# Patient Record
Sex: MALE | Race: WHITE | Employment: OTHER | URBAN - METROPOLITAN AREA
[De-identification: names, ages, dates, MRNs, and addresses within clinical notes are randomized per-mention and may not be internally consistent; named-entity substitution may affect disease eponyms.]

---

## 2017-03-15 PROCEDURE — 82607 VITAMIN B-12: CPT | Performed by: INTERNAL MEDICINE

## 2017-04-19 PROCEDURE — 81001 URINALYSIS AUTO W/SCOPE: CPT | Performed by: UROLOGY

## 2017-11-08 PROCEDURE — 84156 ASSAY OF PROTEIN URINE: CPT | Performed by: INTERNAL MEDICINE

## 2017-11-08 PROCEDURE — 81001 URINALYSIS AUTO W/SCOPE: CPT | Performed by: INTERNAL MEDICINE

## 2017-11-08 PROCEDURE — 82570 ASSAY OF URINE CREATININE: CPT | Performed by: INTERNAL MEDICINE

## 2017-11-08 PROCEDURE — 82310 ASSAY OF CALCIUM: CPT | Performed by: INTERNAL MEDICINE

## 2017-11-08 PROCEDURE — 82565 ASSAY OF CREATININE: CPT | Performed by: INTERNAL MEDICINE

## 2017-11-08 PROCEDURE — 83970 ASSAY OF PARATHORMONE: CPT | Performed by: INTERNAL MEDICINE

## 2017-11-08 PROCEDURE — 84100 ASSAY OF PHOSPHORUS: CPT | Performed by: INTERNAL MEDICINE

## 2017-11-22 ENCOUNTER — HOSPITAL ENCOUNTER (INPATIENT)
Facility: HOSPITAL | Age: 82
LOS: 5 days | Discharge: NURSING, REHAB OR LONG-TERM CARE | DRG: 871 | End: 2017-11-27
Attending: EMERGENCY MEDICINE | Admitting: HOSPITALIST
Payer: MEDICARE

## 2017-11-22 ENCOUNTER — APPOINTMENT (OUTPATIENT)
Dept: GENERAL RADIOLOGY | Facility: HOSPITAL | Age: 82
DRG: 871 | End: 2017-11-22
Attending: EMERGENCY MEDICINE
Payer: MEDICARE

## 2017-11-22 ENCOUNTER — APPOINTMENT (OUTPATIENT)
Dept: CT IMAGING | Facility: HOSPITAL | Age: 82
DRG: 871 | End: 2017-11-22
Attending: EMERGENCY MEDICINE
Payer: MEDICARE

## 2017-11-22 ENCOUNTER — APPOINTMENT (OUTPATIENT)
Dept: CV DIAGNOSTICS | Facility: HOSPITAL | Age: 82
DRG: 871 | End: 2017-11-22
Attending: HOSPITALIST
Payer: MEDICARE

## 2017-11-22 DIAGNOSIS — N18.9 CHRONIC RENAL IMPAIRMENT, UNSPECIFIED CKD STAGE: ICD-10-CM

## 2017-11-22 DIAGNOSIS — R77.8 ELEVATED TROPONIN I LEVEL: ICD-10-CM

## 2017-11-22 DIAGNOSIS — J18.9 COMMUNITY ACQUIRED PNEUMONIA OF LEFT LOWER LOBE OF LUNG: Primary | ICD-10-CM

## 2017-11-22 DIAGNOSIS — G93.40 ENCEPHALOPATHY: ICD-10-CM

## 2017-11-22 PROCEDURE — 85025 COMPLETE CBC W/AUTO DIFF WBC: CPT | Performed by: EMERGENCY MEDICINE

## 2017-11-22 PROCEDURE — 81001 URINALYSIS AUTO W/SCOPE: CPT | Performed by: EMERGENCY MEDICINE

## 2017-11-22 PROCEDURE — 93306 TTE W/DOPPLER COMPLETE: CPT | Performed by: HOSPITALIST

## 2017-11-22 PROCEDURE — 93010 ELECTROCARDIOGRAM REPORT: CPT | Performed by: EMERGENCY MEDICINE

## 2017-11-22 PROCEDURE — 87633 RESP VIRUS 12-25 TARGETS: CPT | Performed by: HOSPITALIST

## 2017-11-22 PROCEDURE — 96366 THER/PROPH/DIAG IV INF ADDON: CPT

## 2017-11-22 PROCEDURE — 99285 EMERGENCY DEPT VISIT HI MDM: CPT

## 2017-11-22 PROCEDURE — 80061 LIPID PANEL: CPT | Performed by: EMERGENCY MEDICINE

## 2017-11-22 PROCEDURE — 87040 BLOOD CULTURE FOR BACTERIA: CPT | Performed by: EMERGENCY MEDICINE

## 2017-11-22 PROCEDURE — 83605 ASSAY OF LACTIC ACID: CPT | Performed by: EMERGENCY MEDICINE

## 2017-11-22 PROCEDURE — 93005 ELECTROCARDIOGRAM TRACING: CPT

## 2017-11-22 PROCEDURE — 71010 XR CHEST AP PORTABLE  (CPT=71010): CPT | Performed by: EMERGENCY MEDICINE

## 2017-11-22 PROCEDURE — 80048 BASIC METABOLIC PNL TOTAL CA: CPT | Performed by: EMERGENCY MEDICINE

## 2017-11-22 PROCEDURE — 87486 CHLMYD PNEUM DNA AMP PROBE: CPT | Performed by: HOSPITALIST

## 2017-11-22 PROCEDURE — 87798 DETECT AGENT NOS DNA AMP: CPT | Performed by: HOSPITALIST

## 2017-11-22 PROCEDURE — 84484 ASSAY OF TROPONIN QUANT: CPT | Performed by: EMERGENCY MEDICINE

## 2017-11-22 PROCEDURE — 84484 ASSAY OF TROPONIN QUANT: CPT | Performed by: HOSPITALIST

## 2017-11-22 PROCEDURE — 70450 CT HEAD/BRAIN W/O DYE: CPT | Performed by: EMERGENCY MEDICINE

## 2017-11-22 PROCEDURE — 85027 COMPLETE CBC AUTOMATED: CPT | Performed by: EMERGENCY MEDICINE

## 2017-11-22 PROCEDURE — 83880 ASSAY OF NATRIURETIC PEPTIDE: CPT | Performed by: EMERGENCY MEDICINE

## 2017-11-22 PROCEDURE — 96365 THER/PROPH/DIAG IV INF INIT: CPT

## 2017-11-22 PROCEDURE — 87581 M.PNEUMON DNA AMP PROBE: CPT | Performed by: HOSPITALIST

## 2017-11-22 PROCEDURE — 84145 PROCALCITONIN (PCT): CPT | Performed by: EMERGENCY MEDICINE

## 2017-11-22 PROCEDURE — 36415 COLL VENOUS BLD VENIPUNCTURE: CPT

## 2017-11-22 PROCEDURE — 85007 BL SMEAR W/DIFF WBC COUNT: CPT | Performed by: EMERGENCY MEDICINE

## 2017-11-22 PROCEDURE — 87086 URINE CULTURE/COLONY COUNT: CPT | Performed by: EMERGENCY MEDICINE

## 2017-11-22 RX ORDER — PAROXETINE HYDROCHLORIDE 20 MG/1
20 TABLET, FILM COATED ORAL DAILY
Status: DISCONTINUED | OUTPATIENT
Start: 2017-11-23 | End: 2017-11-27

## 2017-11-22 RX ORDER — ASPIRIN 81 MG/1
324 TABLET, CHEWABLE ORAL ONCE
Status: COMPLETED | OUTPATIENT
Start: 2017-11-22 | End: 2017-11-22

## 2017-11-22 RX ORDER — ACETAMINOPHEN 325 MG/1
650 TABLET ORAL EVERY 6 HOURS PRN
Status: DISCONTINUED | OUTPATIENT
Start: 2017-11-22 | End: 2017-11-27

## 2017-11-22 RX ORDER — SODIUM CHLORIDE 9 MG/ML
INJECTION, SOLUTION INTRAVENOUS CONTINUOUS
Status: DISCONTINUED | OUTPATIENT
Start: 2017-11-22 | End: 2017-11-25

## 2017-11-22 RX ORDER — ATORVASTATIN CALCIUM 10 MG/1
10 TABLET, FILM COATED ORAL DAILY
Status: DISCONTINUED | OUTPATIENT
Start: 2017-11-23 | End: 2017-11-27

## 2017-11-22 RX ORDER — FLUTICASONE PROPIONATE 50 MCG
1 SPRAY, SUSPENSION (ML) NASAL DAILY PRN
COMMUNITY

## 2017-11-22 RX ORDER — ISOSORBIDE MONONITRATE 60 MG/1
60 TABLET, EXTENDED RELEASE ORAL DAILY
Status: DISCONTINUED | OUTPATIENT
Start: 2017-11-22 | End: 2017-11-26

## 2017-11-22 RX ORDER — BUPROPION HYDROCHLORIDE 300 MG/1
300 TABLET ORAL DAILY
Status: DISCONTINUED | OUTPATIENT
Start: 2017-11-23 | End: 2017-11-27

## 2017-11-22 RX ORDER — ONDANSETRON 2 MG/ML
4 INJECTION INTRAMUSCULAR; INTRAVENOUS EVERY 6 HOURS PRN
Status: DISCONTINUED | OUTPATIENT
Start: 2017-11-22 | End: 2017-11-27

## 2017-11-22 RX ORDER — METOPROLOL SUCCINATE 25 MG/1
25 TABLET, EXTENDED RELEASE ORAL
Status: DISCONTINUED | OUTPATIENT
Start: 2017-11-22 | End: 2017-11-27

## 2017-11-22 RX ORDER — ALFUZOSIN HYDROCHLORIDE 10 MG/1
10 TABLET, EXTENDED RELEASE ORAL
Status: DISCONTINUED | OUTPATIENT
Start: 2017-11-23 | End: 2017-11-27

## 2017-11-22 RX ORDER — ALPRAZOLAM 0.5 MG/1
0.5 TABLET ORAL 3 TIMES DAILY PRN
Status: DISCONTINUED | OUTPATIENT
Start: 2017-11-22 | End: 2017-11-27

## 2017-11-22 RX ORDER — HYDRALAZINE HYDROCHLORIDE 25 MG/1
25 TABLET, FILM COATED ORAL EVERY 8 HOURS PRN
Status: DISCONTINUED | OUTPATIENT
Start: 2017-11-22 | End: 2017-11-27

## 2017-11-22 RX ORDER — DONEPEZIL HYDROCHLORIDE 5 MG/1
5 TABLET, FILM COATED ORAL NIGHTLY
Status: DISCONTINUED | OUTPATIENT
Start: 2017-11-22 | End: 2017-11-27

## 2017-11-22 RX ORDER — HEPARIN SODIUM 5000 [USP'U]/ML
5000 INJECTION, SOLUTION INTRAVENOUS; SUBCUTANEOUS EVERY 8 HOURS SCHEDULED
Status: DISCONTINUED | OUTPATIENT
Start: 2017-11-22 | End: 2017-11-27

## 2017-11-22 RX ORDER — ASPIRIN 81 MG/1
81 TABLET ORAL DAILY
Status: DISCONTINUED | OUTPATIENT
Start: 2017-11-23 | End: 2017-11-27

## 2017-11-22 RX ORDER — SODIUM CHLORIDE 0.9 % (FLUSH) 0.9 %
3 SYRINGE (ML) INJECTION AS NEEDED
Status: DISCONTINUED | OUTPATIENT
Start: 2017-11-22 | End: 2017-11-27

## 2017-11-22 RX ORDER — FINASTERIDE 5 MG/1
5 TABLET, FILM COATED ORAL DAILY
Status: DISCONTINUED | OUTPATIENT
Start: 2017-11-23 | End: 2017-11-27

## 2017-11-22 NOTE — ED PROVIDER NOTES
Patient Seen in: [facility] AND [facility] Emergency Department    History   Patient presents with:  Fall (musculoskeletal, neurologic)    Stated Complaint: falls    HPI    81year old male with h/o dementia, BPH and CKD, recurrent UTI, high cholesterol, hypocal CATARACT EXTRACAP,INSERT LENS  2007: TOTAL KNEE REPLACEMENT      Comment: right        Smoking status: Former Smoker                                                              Packs/day: 0.50      Years: 10.00        Quit date: 1/1/1972  Smokeless tobacc coordination. No dysarthria. Pt is confused but answers questions appropriately at times. When asked if he saw a londono today he states \"no it was a nuno reeves terrier\" - states does not see anything now. Skin: Skin is warm and dry. No rash noted.  He PLATELET    Narrative: The following orders were created for panel order CBC WITH DIFFERENTIAL WITH PLATELET.   Procedure                               Abnormality         Status                     ---------                               ----------- Pulses:  Radial: Right 1+ or Left 1+      Capillary Refill:  <3 Secs    Skin:  Temp/Moisture: Warm and Dry  Color: Normal       Comfort Barcenas  11/22/2017  11:04 AM            Admission disposition: 11/22/2017 11:05 AM         Left lower lobe bronchi, retr

## 2017-11-22 NOTE — ED NOTES
Pt ambulated to bathroom with assist and back to bed. Pt is verbal but confused. Wife is at bedside now.  Dr Reji Gutierrez at bedside assessing

## 2017-11-22 NOTE — CONSULTS
Pulmonary Consult     Assessment / Plan:  1. CAP  - ceftriaxone and azithro  - follow-up cultures    Thanks.  Will follow    Reji Stock MD  Pulmonary and Critical Care Medicine      History of Present Illness:   Mr. Seth Markham is a 81year old with hist TAKE 1 CAPSULE BY MOUTH TWICE DAILY Disp: 180 capsule Rfl: 0 Taking   DONEPEZIL HCL 5 MG Oral Tab TAKE 1 TABLET BY MOUTH EVERY NIGHT AT BEDTIME Disp: 90 tablet Rfl: 0 Taking   PARoxetine HCl 20 MG Oral Tab TAKE 1 TABLET BY MOUTH EVERY MORNING Disp: 90 tabl HCL ER, XL, 300 MG Oral Tablet 24 Hr TAKE 1 TABLET BY MOUTH EVERY DAY Disp: 90 tablet Rfl: 3   Cranberry 200 MG Oral Cap Take by mouth 3 (three) times daily.    Disp:  Rfl:         Iodine Tincture             Comment:IV iodine     Social History  Social His

## 2017-11-22 NOTE — CONSULTS
Reason for Consultation: Abnormal troponin    Assessment/Plan:       1. Community acquired pneumonia of left lower lobe of lung ([facility] [address]  2. 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 Dementia  4. CKD  5. Cancer Neg       Past Surgical History:  1/8/14: OTHER SURGICAL HISTORY      Comment: Cysto-Dr Jael Leija  2007: [facility] CATARACT EXTRACAP,INSERT LENS  2008: [facility] CATARACT EXTRACAP,INSERT LENS  2007: TOTAL KNEE REPLACEMENT      Comment: right  Family History of Pr for the past 24 hrs:   BP Temp Temp src Pulse Resp SpO2 Weight   11/22/17 1400 (!) 176/83 98.1 °F (36.7 °C) Oral 80 20 97 % -   11/22/17 1354 (!) 170/86 - - 85 - 99 % -   11/22/17 1254 (!) 174/84 - - 84 20 97 % -   11/22/17 1152 (!) 168/85 - - 82 20 99 % - atherosclerosis cavernous carotid arteries. 4. Right parietal scalp contusion. Xr Chest Ap Portable  (cpt=71010)    Result Date: 11/22/2017  CONCLUSION:  1. Mild left retrocardiac opacification.                 Thank you for allowing me to participa

## 2017-11-22 NOTE — H&P
[facility] Hospitalist H&P       CC: Patient presents with:  Fall (musculoskeletal, neurologic)       PCP: Dewey Penn MD    History of Present Illness: Patient is a 81year old male with PMH sig for HTN, HLD, depression, CKD stage 3, hs of freq UTI's, dem REPLACEMENT      Comment: right     ALL:    Iodine Tincture             Comment:IV iodine     Home Medications:    No outpatient prescriptions have been marked as taking for the 11/22/17 encounter [facility] Encounter).       Soc Hx     Smoking status: Forme ALPHOS, TBIL, DBIL, TPROT    Recent Labs   Lab  11/22/17   0942   TROP  0.13*          Radiology: Ct Brain Or Head ([id number])    Result Date: 11/22/2017  CONCLUSION:  1. No acute intracranial finding.  2. Moderate changes of chronic small vessel disease in cer house    Patient and/or patient's family given opportunity to ask questions and note understanding and agreeing with therapeutic plan as outlined    Thank Marilyn Borges MD    [facility] Hospitalist  Answering Service number: [phone]

## 2017-11-22 NOTE — CM/SW NOTE
Spoke with patients daughter Tejas Vela verbal Milas Pat given from the patient to update her on his status as well as Rachana Faisal patient's son. After medically stable patient daughter is requesting [facility] [phone] for rehab/further care. Referral sent.

## 2017-11-23 ENCOUNTER — APPOINTMENT (OUTPATIENT)
Dept: GENERAL RADIOLOGY | Facility: HOSPITAL | Age: 82
DRG: 871 | End: 2017-11-23
Attending: HOSPITALIST
Payer: MEDICARE

## 2017-11-23 PROCEDURE — 85610 PROTHROMBIN TIME: CPT | Performed by: HOSPITALIST

## 2017-11-23 PROCEDURE — 94668 MNPJ CHEST WALL SBSQ: CPT

## 2017-11-23 PROCEDURE — 94667 MNPJ CHEST WALL 1ST: CPT

## 2017-11-23 PROCEDURE — 71020 XR CHEST PA + LAT CHEST (CPT=71020): CPT | Performed by: HOSPITALIST

## 2017-11-23 PROCEDURE — 80048 BASIC METABOLIC PNL TOTAL CA: CPT | Performed by: HOSPITALIST

## 2017-11-23 PROCEDURE — 84443 ASSAY THYROID STIM HORMONE: CPT | Performed by: HOSPITALIST

## 2017-11-23 PROCEDURE — 85025 COMPLETE CBC W/AUTO DIFF WBC: CPT | Performed by: HOSPITALIST

## 2017-11-23 PROCEDURE — 83735 ASSAY OF MAGNESIUM: CPT | Performed by: HOSPITALIST

## 2017-11-23 RX ORDER — POTASSIUM CHLORIDE 20 MEQ/1
20 TABLET, EXTENDED RELEASE ORAL ONCE
Status: COMPLETED | OUTPATIENT
Start: 2017-11-23 | End: 2017-11-23

## 2017-11-23 NOTE — PLAN OF CARE
Patient/Family Goals    • Patient/Family Long Term Goal Not Progressing    • Patient/Family Short Term Goal Not Progressing        Patient unable to state goals at this time.  Patient is resting in bed, but at times can get very agitated and try to take off

## 2017-11-23 NOTE — PROGRESS NOTES
[facility] Hospitalist Progress Note     PCP: Gloris Meckel, MD    CC: Follow up       Assessment/Plan:   Mr. Mauro Gabriel is a 81year old male with PMH sig for HTN, HLD, depression, CKD stage 3, hs of freq UTI's, dementia, who presents with cough congestion, ge outlined. D/W wife at bedside    Thank Adam Santiago M.D.  [facility] Hospitalist  Answering Service: [phone]        Subjective     States he feels fine, denies feeling SOB or having CP.   Per wife is doing much better,  Ismael Velázquez me a song to hydrALAzine HCl, ALPRAZolam    Data Review:       Labs:     Recent Labs   Lab  11/22/17   0942  11/23/17   0558   WBC  14.7*  9.6   HGB  11.0*  10.4*   MCV  91.9  90.7   PLT  133*  137*   BAND  5   --    INR   --   1.1       Recent Labs   Lab  11/22/17   0

## 2017-11-23 NOTE — PROGRESS NOTES
Assessment and Plan:     1. Community acquired pneumonia of left lower lobe of lung ([address]  2. 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 Dementia  4. CKD  5. HTN  6.  HLD        PLAN:  - ASA, Echo  - retrocardiac opacification.          Ekg 12-lead    Result Date: 11/22/2017  ECG Report  Interpretation  -------------------------- Sinus Rhythm -First degree A-V block Filippo = 258 -Old anteroseptal infarct -Intraventricular conduction delay -may be secondary

## 2017-11-23 NOTE — PROGRESS NOTES
Pulmonary Progress Note     Assessment / Plan:  1. CAP - RVP notable for coronavirus. Likely has bacterial superinfection given markedly elevated procalcitonin  - ceftriaxone and azithro  - follow-up cultures  - check PCT tomorrow  2.  Elevated troponin  -

## 2017-11-23 NOTE — PLAN OF CARE
Patient/Family Goals    • Patient/Family Long Term Goal Not Progressing    • Patient/Family Short Term Goal Not Progressing          Safety Risk - Non-Violent Restraints    • Patient will remain free from self-harm Progressing        Patient very confused,

## 2017-11-24 PROCEDURE — 97530 THERAPEUTIC ACTIVITIES: CPT

## 2017-11-24 PROCEDURE — 85025 COMPLETE CBC W/AUTO DIFF WBC: CPT | Performed by: HOSPITALIST

## 2017-11-24 PROCEDURE — 84132 ASSAY OF SERUM POTASSIUM: CPT | Performed by: HOSPITALIST

## 2017-11-24 PROCEDURE — 80048 BASIC METABOLIC PNL TOTAL CA: CPT | Performed by: HOSPITALIST

## 2017-11-24 PROCEDURE — 84145 PROCALCITONIN (PCT): CPT | Performed by: INTERNAL MEDICINE

## 2017-11-24 PROCEDURE — 97165 OT EVAL LOW COMPLEX 30 MIN: CPT

## 2017-11-24 PROCEDURE — 97161 PT EVAL LOW COMPLEX 20 MIN: CPT

## 2017-11-24 RX ORDER — LISINOPRIL 10 MG/1
10 TABLET ORAL DAILY
Status: DISCONTINUED | OUTPATIENT
Start: 2017-11-24 | End: 2017-11-27

## 2017-11-24 RX ORDER — SODIUM CHLORIDE 9 MG/ML
INJECTION, SOLUTION INTRAVENOUS
Status: COMPLETED
Start: 2017-11-24 | End: 2017-11-24

## 2017-11-24 NOTE — PLAN OF CARE
ANXIETY    • Will report anxiety at manageable levels Progressing        CONFUSION    • Confusion, delirium, dementia or psychosis is improved or at baseline Progressing        Patient/Family Goals    • Patient/Family Long Term Goal Progressing    • Patien

## 2017-11-24 NOTE — PLAN OF CARE
CONFUSION    • Confusion, delirium, dementia or psychosis is improved or at baseline Not Progressing          ANXIETY    • Will report anxiety at manageable levels Progressing        Patient/Family Goals    • Patient/Family Long Term Goal Progressing    •

## 2017-11-24 NOTE — PLAN OF CARE
CONTINUING IVF AND IV ANTIBIOTICS, PT. VERY CONFUSED, CONSISTENTLY TRYING TO GET OUT OF BED AND PULL AT LINES,

## 2017-11-24 NOTE — PROGRESS NOTES
[facility] Hospitalist Progress Note     PCP: Urvashi Davis MD    CC: Follow up       Assessment/Plan:   Mr. Alisson Evans is a 81year old male with PMH sig for HTN, HLD, depression, CKD stage 3, hs of freq UTI's, dementia, who presents with cough congestion, ge and agreeing with therapeutic plan as outlined. Will update wife. Thank Lara Fregoso M.D.    [facility] Hospitalist  Answering Service: [phone]        Subjective     No new complaints, wife not at bedside.       Objective     OBJECT HCl, hydrALAzine HCl, ALPRAZolam    Data Review:       Labs:     Recent Labs   Lab  11/22/17   0942  11/23/17   0558  11/24/17   0619   WBC  14.7*  9.6  9.0   HGB  11.0*  10.4*  10.4*   MCV  91.9  90.7  91.5   PLT  133*  137*  142   BAND  5   --    --    I

## 2017-11-24 NOTE — PROGRESS NOTES
Pulmonary Progress Note     Assessment / Plan:  1. CAP - RVP notable for coronavirus. Likely has bacterial superinfection given markedly elevated procalcitonin. Repeat CXR is improved. PCT is downtrending, albeit slowly  - ceftriaxone and azithro.  Okay to

## 2017-11-24 NOTE — PROGRESS NOTES
Assessment and Plan:     1. Community acquired pneumonia of left lower lobe of lung ([facility] [address]  2. 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 Dementia  4. CKD, stable  5. HTN, elevated BPs  6.  HLD, on sta improved since previous day, suggesting resolving atelectasis or pneumonia. 2. Hyperinflated lungs with prominent markings. 3. Mild cardiomegaly. 4. Small bilateral pleural effusions noted on lateral view.          Ct Brain Or Head ([id number])    Result Date: 1

## 2017-11-24 NOTE — OCCUPATIONAL THERAPY NOTE
OCCUPATIONAL THERAPY EVALUATION - INPATIENT     Room Number: 303/303-A  Evaluation Date: 11/24/2017  Type of Evaluation: Initial  Presenting Problem: pneumonia    Physician Order: IP Consult to Occupational Therapy  Reason for Therapy: ADL/IADL Dysfunction troponin I level    Past Medical History  Past Medical History:   Diagnosis Date   • Acute right-sided low back pain with right-sided sciatica 12/29/2016   • Allergic rhinitis, unspecified allergic rhinitis type 11/11/2015   • AR (allergic rhinitis)    • B Restraints;Bed/chair alarm  Fall Risk: High fall risk    PAIN ASSESSMENT  Ratin    ACTIVITY TOLERANCE  good    COGNITION  Alert and oriented to person    Communication: intact    Behavioral/Emotional/Social: pt pleasant and cooperative on initial asses Patient will maintain static standing at sink level for 3 minutes to complete self care task  Comment:            Goals  on:17   Frequency: 3x/week

## 2017-11-25 PROCEDURE — 85025 COMPLETE CBC W/AUTO DIFF WBC: CPT | Performed by: HOSPITALIST

## 2017-11-25 PROCEDURE — 80048 BASIC METABOLIC PNL TOTAL CA: CPT | Performed by: HOSPITALIST

## 2017-11-25 PROCEDURE — 94668 MNPJ CHEST WALL SBSQ: CPT

## 2017-11-25 NOTE — PROGRESS NOTES
[facility] Hospitalist Progress Note     PCP: Benita Ridley MD    CC: Follow up       Assessment/Plan:   Mr. Carmen Lloyd is a 81year old male with PMH sig for HTN, HLD, depression, CKD stage 3, hs of freq UTI's, dementia, who presents with cough congestion, ge to ask questions and note understanding and agreeing with therapeutic plan as outlined. Will update wife.       Thank Cate Rojo M.D.    [facility] Hospitalist  Answering Service: [phone]        Subjective     No new complaints, wife u Saline Flush, acetaminophen, ondansetron HCl, hydrALAzine HCl, ALPRAZolam    Data Review:       Labs:     Recent Labs   Lab  11/23/17   0558  11/24/17   0619  11/25/17   0658   WBC  9.6  9.0  6.3   HGB  10.4*  10.4*  10.0*   MCV  90.7  91.5  90.6   PLT  13

## 2017-11-25 NOTE — PROGRESS NOTES
Assessment and Plan:     1. Community acquired pneumonia of left lower lobe of lung ([facility] [address]  2. 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 Dementia  4. CKD, stable  5. HTN, elevated BPs  6.  HLD, on sta 11/23/2017  CONCLUSION:  1. Retrocardiac/left basilar airspace disease appears improved since previous day, suggesting resolving atelectasis or pneumonia. 2. Hyperinflated lungs with prominent markings. 3. Mild cardiomegaly.  4. Small bilateral pleural effu

## 2017-11-26 PROCEDURE — 94668 MNPJ CHEST WALL SBSQ: CPT

## 2017-11-26 PROCEDURE — 85025 COMPLETE CBC W/AUTO DIFF WBC: CPT | Performed by: HOSPITALIST

## 2017-11-26 PROCEDURE — 80048 BASIC METABOLIC PNL TOTAL CA: CPT | Performed by: HOSPITALIST

## 2017-11-26 RX ORDER — POTASSIUM CHLORIDE 20 MEQ/1
40 TABLET, EXTENDED RELEASE ORAL EVERY 4 HOURS
Status: COMPLETED | OUTPATIENT
Start: 2017-11-26 | End: 2017-11-26

## 2017-11-26 RX ORDER — AMLODIPINE BESYLATE 5 MG/1
5 TABLET ORAL DAILY
Status: DISCONTINUED | OUTPATIENT
Start: 2017-11-26 | End: 2017-11-27

## 2017-11-26 NOTE — PROGRESS NOTES
Assessment and Plan:     1. Community acquired pneumonia of left lower lobe of lung ([facility] [address]  2. 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 Dementia  4. CKD, stable  5. HTN, elevated BPs  6.  HLD, on sta mildly to     moderately reduced. The estimated ejection fraction was 35-40%. Hypokinesis     of the inferolateral and inferior myocardium. 2. Aortic valve: There was mild stenosis. Mean gradient: 10mm Hg (S). Valve     area: 1.75cm^2(VTI). Valve area: 1.

## 2017-11-26 NOTE — PROGRESS NOTES
[facility] Hospitalist Progress Note     PCP: Jose Guadalupe Lemos MD    CC: Follow up       Assessment/Plan:   Mr. Fabby Montaño is a 81year old male with PMH sig for HTN, HLD, depression, CKD stage 3, hs of freq UTI's, dementia, who presents with cough congestion, ge hospital records reviewed.      Further recommendations pending patient's clinical course.   [facility] hospitalist to continue to follow patient while in house     Patient and/or patient's family given opportunity to ask questions and note understanding and agree 1 g Intravenous Q24H   • azithromycin  500 mg Intravenous Q24H   • aspirin  81 mg Oral Daily   • Metoprolol Succinate ER  25 mg Oral 2x Daily(Beta Blocker)   • Isosorbide Mononitrate ER  60 mg Oral Daily       Normal Saline Flush, acetaminophen, ondansetro

## 2017-11-26 NOTE — PLAN OF CARE
ANXIETY    • Will report anxiety at manageable levels Progressing        CONFUSION    • Confusion, delirium, dementia or psychosis is improved or at baseline Progressing        Diabetes/Glucose Control    • Glucose maintained within prescribed range Progre

## 2017-11-27 VITALS
DIASTOLIC BLOOD PRESSURE: 104 MMHG | SYSTOLIC BLOOD PRESSURE: 179 MMHG | WEIGHT: 165.88 LBS | TEMPERATURE: 98 F | HEART RATE: 63 BPM | OXYGEN SATURATION: 99 % | BODY MASS INDEX: 25 KG/M2 | RESPIRATION RATE: 14 BRPM

## 2017-11-27 PROCEDURE — 97116 GAIT TRAINING THERAPY: CPT

## 2017-11-27 PROCEDURE — 97110 THERAPEUTIC EXERCISES: CPT

## 2017-11-27 PROCEDURE — 94668 MNPJ CHEST WALL SBSQ: CPT

## 2017-11-27 PROCEDURE — 80048 BASIC METABOLIC PNL TOTAL CA: CPT | Performed by: HOSPITALIST

## 2017-11-27 PROCEDURE — 84132 ASSAY OF SERUM POTASSIUM: CPT | Performed by: HOSPITALIST

## 2017-11-27 RX ORDER — ASPIRIN 81 MG/1
81 TABLET ORAL DAILY
Qty: 30 TABLET | Refills: 0 | Status: SHIPPED | OUTPATIENT
Start: 2017-11-27 | End: 2017-12-26

## 2017-11-27 RX ORDER — ALPRAZOLAM 0.5 MG/1
0.5 TABLET ORAL 3 TIMES DAILY PRN
Qty: 30 TABLET | Refills: 0 | Status: SHIPPED | OUTPATIENT
Start: 2017-11-27 | End: 2018-01-29 | Stop reason: ALTCHOICE

## 2017-11-27 RX ORDER — CEFUROXIME AXETIL 500 MG/1
500 TABLET ORAL 2 TIMES DAILY
Qty: 2 TABLET | Refills: 0 | Status: SHIPPED | OUTPATIENT
Start: 2017-11-28 | End: 2017-12-26

## 2017-11-27 RX ORDER — HYDRALAZINE HYDROCHLORIDE 25 MG/1
25 TABLET, FILM COATED ORAL EVERY 8 HOURS PRN
Qty: 30 TABLET | Refills: 0 | Status: SHIPPED | OUTPATIENT
Start: 2017-11-27 | End: 2018-03-21

## 2017-11-27 RX ORDER — METOPROLOL SUCCINATE 50 MG/1
50 TABLET, EXTENDED RELEASE ORAL
Status: DISCONTINUED | OUTPATIENT
Start: 2017-11-27 | End: 2017-11-27

## 2017-11-27 RX ORDER — LISINOPRIL 10 MG/1
10 TABLET ORAL DAILY
Qty: 30 TABLET | Refills: 0 | Status: SHIPPED | OUTPATIENT
Start: 2017-11-27 | End: 2017-12-26

## 2017-11-27 RX ORDER — METOPROLOL SUCCINATE 50 MG/1
50 TABLET, EXTENDED RELEASE ORAL
Qty: 60 TABLET | Refills: 0 | Status: SHIPPED | OUTPATIENT
Start: 2017-11-27 | End: 2018-01-29

## 2017-11-27 RX ORDER — AMLODIPINE BESYLATE 5 MG/1
5 TABLET ORAL DAILY
Qty: 30 TABLET | Refills: 0 | Status: SHIPPED | OUTPATIENT
Start: 2017-11-28 | End: 2018-01-29

## 2017-11-27 NOTE — CM/SW NOTE
11/27/ 17   Discharge planning   Spoke with Fabby kohli at [facility] available for pt today at 4:00 pm, RN report [phone], transport arranged via [facility] [address]. Pt is confused unable to be left unattended.  Wife aware and in agreement with

## 2017-11-27 NOTE — PROGRESS NOTES
[facility] Hospitalist Progress Note     PCP: Benita Ridley MD    CC: Follow up       Assessment/Plan:   Mr. Carmen Lloyd is a 81year old male with PMH sig for HTN, HLD, depression, CKD stage 3, hs of freq UTI's, dementia, who presents with cough congestion, ge given opportunity to ask questions and note understanding and agreeing with therapeutic plan as outlined.       Thank Mayuri Fajardo M.D.    [facility] Hospitalist  Answering Service: [phone]        Subjective     Patient is in a good mood toda (Porcine)  5,000 Units Subcutaneous [facility]   • cefTRIAXone  1 g Intravenous Q24H   • aspirin  81 mg Oral Daily       Normal Saline Flush, acetaminophen, ondansetron HCl, hydrALAzine HCl, ALPRAZolam    Data Review:       Labs:     Recent Labs   Lab  11/25/

## 2017-11-27 NOTE — CM/SW NOTE
Explanation of of BPCI/Medicare program provided. Patient was enrolled under . Patient/family agreed to phone f/u for 3 months from [address] after discharge from [address] [place] [address]. BPCI/Medicare letter and brochure provided.     Plan [facility]

## 2017-11-27 NOTE — PROGRESS NOTES
Assessment and Plan:     1. Community acquired pneumonia of left lower lobe of lung ([facility] [address]  2. 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 Dementia  4. CKD, stable  5. HTN, elevated BPs  6.  HLD, on sta normal. Systolic function was mildly to     moderately reduced. The estimated ejection fraction was 35-40%. Hypokinesis     of the inferolateral and inferior myocardium. 2. Aortic valve: There was mild stenosis. Mean gradient: 10mm Hg (S).  Valve     area:

## 2017-11-27 NOTE — PROGRESS NOTES
Pulmonary Progress Note     Assessment / Plan:  1. CAP - RVP notable for coronavirus. Likely has bacterial superinfection given markedly elevated procalcitonin. Repeat CXR is improved  - ceftriaxone and azithro.  Okay to DC on ceftin to complete a total of

## 2017-11-27 NOTE — PHYSICAL THERAPY NOTE
PHYSICAL THERAPY TREATMENT NOTE - INPATIENT    Room Number: 715/419-E       Presenting Problem: community aquired pneumonia    Problem List  Principal Problem:    Community acquired pneumonia of left lower lobe of lung ([address]  Active Problems:    Encephalo INPATIENT SHORT FORM - BASIC MOBILITY  How much difficulty does the patient currently have. ..  -   Turning over in bed (including adjusting bedclothes, sheets and blankets)?: A Little   -   Sitting down on and standing up from a chair with arms (e.g., whee activity/exercise instructions provided to patient in preparation for discharge.    Goal #4   Current Status In progress   Goal #5    Goal #5   Current Status    Goal #6    Goal #6  Current Status

## 2017-11-27 NOTE — CM/SW NOTE
11/27/17  Discharge planning   Update medical records faxed to [facility], DN requested from [facility]. Spoke with Jett Mejia at [facility] requested isolation bed for later today.    Cruz Fernandes X [id number]

## 2017-11-27 NOTE — PROGRESS NOTES
Pt d/c to [facility] via ambulance with all belongings. Report called to RN at [facility]. IV site discontinued, tele box off.

## 2017-11-29 ENCOUNTER — SNF VISIT (OUTPATIENT)
Dept: INTERNAL MEDICINE CLINIC | Facility: SKILLED NURSING FACILITY | Age: 82
End: 2017-11-29

## 2017-11-29 DIAGNOSIS — N18.9 ANEMIA OF RENAL DISEASE: ICD-10-CM

## 2017-11-29 DIAGNOSIS — F41.1 GAD (GENERALIZED ANXIETY DISORDER): ICD-10-CM

## 2017-11-29 DIAGNOSIS — D63.1 ANEMIA OF RENAL DISEASE: ICD-10-CM

## 2017-11-29 DIAGNOSIS — N18.30 CKD (CHRONIC KIDNEY DISEASE) STAGE 3, GFR 30-59 ML/MIN (HCC): ICD-10-CM

## 2017-11-29 DIAGNOSIS — J18.9 COMMUNITY ACQUIRED PNEUMONIA OF LEFT LOWER LOBE OF LUNG: ICD-10-CM

## 2017-11-29 DIAGNOSIS — R03.0 ELEVATED BP WITHOUT DIAGNOSIS OF HYPERTENSION: ICD-10-CM

## 2017-11-29 PROCEDURE — 99310 SBSQ NF CARE HIGH MDM 45: CPT | Performed by: NURSE PRACTITIONER

## 2017-11-29 NOTE — PROGRESS NOTES
HPI: Darian Castro  Is an 82 yo male with PMH significant for HTN, HLD, depression, CKD stage 3, frequent UTI's, dementia who was admitted to [address] [place] [address] with cough congestion, generalized weakness and falls due to coronavirusand bacterial LL PNA/sepsis.   He w • Obesity Mother    • Other [place] [employer] Mother      old age   • Cancer Neg      Smoking status: Former Smoker                                                              Packs/day: 0.50      Years: 10.00        Quit date: 1/1/1972  Smokeless tobacco: Never cont aspirin, statin, BB, acei  - cardiology consult in house      3. CKD  - baseline cre 2.18 - 1.8  - monitor in rehab     4. Encephalopathy / gen weakness  - CT head negative inpatient  -PT/OT, fall precautions     5.  HTN  - during admission BP meds tit

## 2017-11-30 ENCOUNTER — SNF VISIT (OUTPATIENT)
Dept: INTERNAL MEDICINE CLINIC | Facility: SKILLED NURSING FACILITY | Age: 82
End: 2017-11-30

## 2017-11-30 DIAGNOSIS — B34.2 CORONAVIRUS INFECTION: ICD-10-CM

## 2017-11-30 DIAGNOSIS — J18.9 COMMUNITY ACQUIRED PNEUMONIA OF LEFT LOWER LOBE OF LUNG: ICD-10-CM

## 2017-11-30 DIAGNOSIS — R03.0 ELEVATED BP WITHOUT DIAGNOSIS OF HYPERTENSION: ICD-10-CM

## 2017-11-30 PROCEDURE — 99308 SBSQ NF CARE LOW MDM 20: CPT | Performed by: NURSE PRACTITIONER

## 2017-11-30 NOTE — PROGRESS NOTES
HPI: Judy Riojas  Is an 82 yo male with PMH significant for HTN, HLD, depression, CKD stage 3, frequent UTI's, dementia who was admitted to [address] [place] [address] with cough congestion, generalized weakness and falls due to coronavirusand bacterial LL PNA/sepsis.   He w Comment:IV iodine        CURRENT MEDICATIONS: Reviewed on [facility] EMR  VITALS: Reviewed   LABS/Imaging: Reviewed                 REVIEW OF SYSTEMS: Seen and examined in room. No sob or cough. No chest pain/palpitaitons. No n/v/d.  No hematuria/dysuria/frequenc admission BP meds titrated  -on norvasc 5mg (started 11/26) increased to 10mg po qd 11/29, lisinopril 10mg (started 11/24), Metoprolol XL 50mg BID (titrated up 11/27)  - has prn hydralazine 25mg po q8 for SBP >175     6.  AIDA/ depression  - continue xanax 0

## 2017-12-04 ENCOUNTER — SNF VISIT (OUTPATIENT)
Dept: INTERNAL MEDICINE CLINIC | Facility: SKILLED NURSING FACILITY | Age: 82
End: 2017-12-04

## 2017-12-04 DIAGNOSIS — I10 ESSENTIAL HYPERTENSION: ICD-10-CM

## 2017-12-04 DIAGNOSIS — J18.9 COMMUNITY ACQUIRED PNEUMONIA OF LEFT LOWER LOBE OF LUNG: ICD-10-CM

## 2017-12-04 PROCEDURE — 99308 SBSQ NF CARE LOW MDM 20: CPT | Performed by: NURSE PRACTITIONER

## 2017-12-04 NOTE — PROGRESS NOTES
HPI: Lamont Jean  Is an 82 yo male with PMH significant for HTN, HLD, depression, CKD stage 3, frequent UTI's, dementia who was admitted to [address] [place] [address] with cough congestion, generalized weakness and falls due to coronavirusand bacterial LL PNA/sepsis.  He w             Comment:IV iodine        CURRENT MEDICATIONS: Reviewed on [facility] EMR  VITALS: Reviewed   LABS/Imaging: Reviewed     REVIEW OF SYSTEMS: Doing well. Completed antibiotics. No fevers/chills. No sob or cough. No chest pain/palpitaitons. No n/v/d.  No depression. Stable. - continue xanax 0.5mg po tid prn for anxiety or sleep, bupropion 300mg po qd, paroxetine 20mg po qd   -in house psych consult       7. Dementia. Stable.   - continue present management donepezil 5mg po qd  - in house psych consult

## 2017-12-06 ENCOUNTER — SNF VISIT (OUTPATIENT)
Dept: INTERNAL MEDICINE CLINIC | Facility: SKILLED NURSING FACILITY | Age: 82
End: 2017-12-06

## 2017-12-06 DIAGNOSIS — N18.30 CKD (CHRONIC KIDNEY DISEASE) STAGE 3, GFR 30-59 ML/MIN (HCC): ICD-10-CM

## 2017-12-06 DIAGNOSIS — J18.9 COMMUNITY ACQUIRED PNEUMONIA OF LEFT LOWER LOBE OF LUNG: ICD-10-CM

## 2017-12-06 DIAGNOSIS — D63.1 ANEMIA OF RENAL DISEASE: ICD-10-CM

## 2017-12-06 DIAGNOSIS — N18.9 ANEMIA OF RENAL DISEASE: ICD-10-CM

## 2017-12-06 DIAGNOSIS — R53.1 WEAKNESS: ICD-10-CM

## 2017-12-06 PROCEDURE — 99307 SBSQ NF CARE SF MDM 10: CPT | Performed by: NURSE PRACTITIONER

## 2017-12-06 NOTE — PROGRESS NOTES
HPI: Tai Jean  Is an 82 yo male with PMH significant for HTN, HLD, depression, CKD stage 3, frequent UTI's, dementia who was admitted to [address] [place] [address] with cough congestion, generalized weakness and falls due to coronavirusand bacterial LL PNA/sepsis.  He w             Comment:IV iodine        CURRENT MEDICATIONS: Reviewed on  EMR  VITALS: Reviewed   LABS/Imaging: Reviewed     REVIEW OF SYSTEMS: Doing well. No cough or sob. Doing well in therapy. No fevers/chills.   No chest pain/palpitaitons. No n/v/d.  No 50mg BID (titrated up 11/27)  - has prn hydralazine 25mg po q8 for SBP >175     6. AIDA/ depression. Stable. - continue xanax 0.5mg po tid prn for anxiety or sleep, bupropion 300mg po qd, paroxetine 20mg po qd   -in house psych consult       7. Dementia.

## 2017-12-13 ENCOUNTER — SNF VISIT (OUTPATIENT)
Dept: INTERNAL MEDICINE CLINIC | Facility: SKILLED NURSING FACILITY | Age: 82
End: 2017-12-13

## 2017-12-13 DIAGNOSIS — N18.30 CKD (CHRONIC KIDNEY DISEASE) STAGE 3, GFR 30-59 ML/MIN (HCC): ICD-10-CM

## 2017-12-13 DIAGNOSIS — D63.1 ANEMIA OF RENAL DISEASE: ICD-10-CM

## 2017-12-13 DIAGNOSIS — N18.9 ANEMIA OF RENAL DISEASE: ICD-10-CM

## 2017-12-13 DIAGNOSIS — I10 ESSENTIAL HYPERTENSION: ICD-10-CM

## 2017-12-13 DIAGNOSIS — R00.1 BRADYCARDIA: ICD-10-CM

## 2017-12-13 PROCEDURE — 99309 SBSQ NF CARE MODERATE MDM 30: CPT | Performed by: NURSE PRACTITIONER

## 2017-12-13 NOTE — PROGRESS NOTES
HPI: Chaitanya Jean  Is an 80 yo male with PMH significant for HTN, HLD, depression, CKD stage 3, frequent UTI's, dementia who was admitted to [facility] with cough congestion, generalized weakness and falls due to coronavirusand bacterial LL PNA/sepsis.  He w             Comment:IV iodine        CURRENT MEDICATIONS: Reviewed on SNF EMR  VITALS: Reviewed   LABS/Imaging: Reviewed     REVIEW OF SYSTEMS: Doing well, going home today.  HR has been in 50-60s and as low as 49, discussed with cardiology and will decreas precautions     5. HTN. Stable/controlled.    - during inpatient admission BP meds titrated  -on norvasc 5mg (started 11/26) increased to 10mg po qd 11/29, lisinopril 10mg (started 11/24), Metoprolol XL 50mg once daily   - has prn hydralazine 25mg po q8 for

## 2018-01-01 PROCEDURE — 82652 VIT D 1 25-DIHYDROXY: CPT | Performed by: INTERNAL MEDICINE

## 2018-01-01 PROCEDURE — 81001 URINALYSIS AUTO W/SCOPE: CPT | Performed by: INTERNAL MEDICINE

## 2018-01-01 PROCEDURE — 83970 ASSAY OF PARATHORMONE: CPT | Performed by: INTERNAL MEDICINE

## 2018-01-01 PROCEDURE — 87186 SC STD MICRODIL/AGAR DIL: CPT | Performed by: INTERNAL MEDICINE

## 2018-01-01 PROCEDURE — 82310 ASSAY OF CALCIUM: CPT | Performed by: INTERNAL MEDICINE

## 2018-01-01 PROCEDURE — 82570 ASSAY OF URINE CREATININE: CPT | Performed by: EMERGENCY MEDICINE

## 2018-01-01 PROCEDURE — 87186 SC STD MICRODIL/AGAR DIL: CPT | Performed by: PHYSICIAN ASSISTANT

## 2018-01-01 PROCEDURE — 87086 URINE CULTURE/COLONY COUNT: CPT | Performed by: INTERNAL MEDICINE

## 2018-01-01 PROCEDURE — 84100 ASSAY OF PHOSPHORUS: CPT | Performed by: INTERNAL MEDICINE

## 2018-01-01 PROCEDURE — 82570 ASSAY OF URINE CREATININE: CPT | Performed by: INTERNAL MEDICINE

## 2018-01-01 PROCEDURE — 87086 URINE CULTURE/COLONY COUNT: CPT | Performed by: PHYSICIAN ASSISTANT

## 2018-01-01 PROCEDURE — 84300 ASSAY OF URINE SODIUM: CPT | Performed by: EMERGENCY MEDICINE

## 2018-01-01 PROCEDURE — 87077 CULTURE AEROBIC IDENTIFY: CPT | Performed by: INTERNAL MEDICINE

## 2018-01-01 PROCEDURE — 84156 ASSAY OF PROTEIN URINE: CPT | Performed by: INTERNAL MEDICINE

## 2018-01-01 PROCEDURE — 82565 ASSAY OF CREATININE: CPT | Performed by: INTERNAL MEDICINE

## 2018-01-01 PROCEDURE — 87077 CULTURE AEROBIC IDENTIFY: CPT | Performed by: PHYSICIAN ASSISTANT

## 2018-01-29 PROBLEM — I70.0 THORACIC AORTA ATHEROSCLEROSIS (HCC): Status: ACTIVE | Noted: 2018-01-29

## 2018-01-29 PROBLEM — N18.9 CHRONIC RENAL IMPAIRMENT, UNSPECIFIED CKD STAGE: Status: RESOLVED | Noted: 2017-11-22 | Resolved: 2018-01-29

## 2018-01-29 PROBLEM — R77.8 ELEVATED TROPONIN I LEVEL: Status: RESOLVED | Noted: 2017-11-22 | Resolved: 2018-01-29

## 2018-03-21 PROBLEM — G30.1 LATE ONSET ALZHEIMER'S DISEASE WITHOUT BEHAVIORAL DISTURBANCE (HCC): Status: ACTIVE | Noted: 2018-03-21

## 2018-03-21 PROBLEM — R00.1 BRADYCARDIA: Status: ACTIVE | Noted: 2018-03-21

## 2018-03-21 PROBLEM — F02.80 LATE ONSET ALZHEIMER'S DISEASE WITHOUT BEHAVIORAL DISTURBANCE (HCC): Status: ACTIVE | Noted: 2018-03-21

## 2018-04-11 PROCEDURE — 84156 ASSAY OF PROTEIN URINE: CPT | Performed by: INTERNAL MEDICINE

## 2018-04-11 PROCEDURE — 83970 ASSAY OF PARATHORMONE: CPT | Performed by: INTERNAL MEDICINE

## 2018-04-11 PROCEDURE — 81001 URINALYSIS AUTO W/SCOPE: CPT | Performed by: INTERNAL MEDICINE

## 2018-04-11 PROCEDURE — 82570 ASSAY OF URINE CREATININE: CPT | Performed by: INTERNAL MEDICINE

## 2018-04-11 PROCEDURE — 82652 VIT D 1 25-DIHYDROXY: CPT | Performed by: INTERNAL MEDICINE

## 2018-05-28 PROBLEM — G93.40 ENCEPHALOPATHY: Status: RESOLVED | Noted: 2017-11-22 | Resolved: 2018-05-28

## 2018-05-28 PROBLEM — J18.9 COMMUNITY ACQUIRED PNEUMONIA OF LEFT LOWER LOBE OF LUNG: Status: RESOLVED | Noted: 2017-11-22 | Resolved: 2018-05-28

## 2018-05-28 PROBLEM — J30.9 ALLERGIC RHINITIS, UNSPECIFIED SEASONALITY, UNSPECIFIED TRIGGER: Status: ACTIVE | Noted: 2018-05-28

## 2018-07-17 PROBLEM — R60.9 EDEMA, UNSPECIFIED TYPE: Status: ACTIVE | Noted: 2018-01-01

## 2018-09-09 PROBLEM — N18.4 CKD (CHRONIC KIDNEY DISEASE) STAGE 4, GFR 15-29 ML/MIN (HCC): Status: ACTIVE | Noted: 2018-01-01

## 2019-01-01 ENCOUNTER — APPOINTMENT (OUTPATIENT)
Dept: CV DIAGNOSTICS | Facility: HOSPITAL | Age: 84
DRG: 292 | End: 2019-01-01
Attending: HOSPITALIST
Payer: MEDICARE

## 2019-01-01 ENCOUNTER — LAB ENCOUNTER (OUTPATIENT)
Dept: LAB | Facility: HOSPITAL | Age: 84
End: 2019-01-01
Attending: INTERNAL MEDICINE
Payer: MEDICARE

## 2019-01-01 ENCOUNTER — HOSPITAL ENCOUNTER (EMERGENCY)
Facility: HOSPITAL | Age: 84
Discharge: HOME | End: 2019-01-01
Attending: EMERGENCY MEDICINE
Payer: MEDICARE

## 2019-01-01 ENCOUNTER — APPOINTMENT (OUTPATIENT)
Dept: ULTRASOUND IMAGING | Facility: HOSPITAL | Age: 84
DRG: 292 | End: 2019-01-01
Attending: INTERNAL MEDICINE
Payer: MEDICARE

## 2019-01-01 ENCOUNTER — LAB REQUISITION (OUTPATIENT)
Dept: LAB | Facility: HOSPITAL | Age: 84
End: 2019-01-01
Payer: MEDICARE

## 2019-01-01 ENCOUNTER — LAB ENCOUNTER (OUTPATIENT)
Dept: LAB | Facility: HOSPITAL | Age: 84
DRG: 292 | End: 2019-01-01
Attending: INTERNAL MEDICINE
Payer: MEDICARE

## 2019-01-01 ENCOUNTER — HOSPITAL ENCOUNTER (INPATIENT)
Facility: HOSPITAL | Age: 84
LOS: 6 days | Discharge: HOME HEALTH | DRG: 292 | End: 2019-01-01
Attending: HOSPITALIST | Admitting: HOSPITALIST
Payer: MEDICARE

## 2019-01-01 ENCOUNTER — APPOINTMENT (OUTPATIENT)
Dept: GENERAL RADIOLOGY | Facility: HOSPITAL | Age: 84
DRG: 292 | End: 2019-01-01
Attending: HOSPITALIST
Payer: MEDICARE

## 2019-01-01 VITALS
OXYGEN SATURATION: 100 % | RESPIRATION RATE: 18 BRPM | TEMPERATURE: 97 F | BODY MASS INDEX: 22.66 KG/M2 | HEIGHT: 69 IN | WEIGHT: 153 LBS | SYSTOLIC BLOOD PRESSURE: 131 MMHG | HEART RATE: 68 BPM | DIASTOLIC BLOOD PRESSURE: 58 MMHG

## 2019-01-01 VITALS
OXYGEN SATURATION: 96 % | DIASTOLIC BLOOD PRESSURE: 58 MMHG | TEMPERATURE: 98 F | BODY MASS INDEX: 22 KG/M2 | SYSTOLIC BLOOD PRESSURE: 114 MMHG | RESPIRATION RATE: 20 BRPM | HEART RATE: 68 BPM | WEIGHT: 148.63 LBS

## 2019-01-01 DIAGNOSIS — I12.9 CKD STAGE 4 SECONDARY TO HYPERTENSION (HCC): ICD-10-CM

## 2019-01-01 DIAGNOSIS — R33.9 URINARY RETENTION: ICD-10-CM

## 2019-01-01 DIAGNOSIS — N18.9 ACUTE RENAL FAILURE SUPERIMPOSED ON CHRONIC KIDNEY DISEASE, UNSPECIFIED CKD STAGE, UNSPECIFIED ACUTE RENAL FAILURE TYPE (HCC): Primary | ICD-10-CM

## 2019-01-01 DIAGNOSIS — N18.4 CHRONIC KIDNEY DISEASE, STAGE IV (SEVERE) (HCC): ICD-10-CM

## 2019-01-01 DIAGNOSIS — I13.0 HYPERTENSIVE HEART AND CHRONIC KIDNEY DISEASE WITH HEART FAILURE AND STAGE 1 THROUGH STAGE 4 CHRONIC KIDNEY DISEASE, OR CHRONIC KIDNEY DISEASE (HCC): ICD-10-CM

## 2019-01-01 DIAGNOSIS — N18.4 CKD STAGE 4 SECONDARY TO HYPERTENSION (HCC): ICD-10-CM

## 2019-01-01 DIAGNOSIS — N17.9 AKI (ACUTE KIDNEY INJURY) (HCC): Primary | ICD-10-CM

## 2019-01-01 DIAGNOSIS — I50.9 HEART FAILURE (HCC): ICD-10-CM

## 2019-01-01 DIAGNOSIS — N17.9 ACUTE RENAL FAILURE SUPERIMPOSED ON CHRONIC KIDNEY DISEASE, UNSPECIFIED CKD STAGE, UNSPECIFIED ACUTE RENAL FAILURE TYPE (HCC): Primary | ICD-10-CM

## 2019-01-01 LAB
ANION GAP SERPL CALC-SCNC: 10 MMOL/L (ref 0–18)
ANION GAP SERPL CALC-SCNC: 11 MMOL/L (ref 0–18)
BUN BLD-MCNC: 48 MG/DL (ref 7–18)
BUN BLD-MCNC: 57 MG/DL (ref 7–18)
BUN/CREAT SERPL: 10.4 (ref 10–20)
BUN/CREAT SERPL: 10.6 (ref 10–20)
CALCIUM BLD-MCNC: 8.2 MG/DL (ref 8.5–10.1)
CALCIUM BLD-MCNC: 8.5 MG/DL (ref 8.5–10.1)
CHLORIDE SERPL-SCNC: 111 MMOL/L (ref 98–107)
CHLORIDE SERPL-SCNC: 115 MMOL/L (ref 98–107)
CO2 SERPL-SCNC: 18 MMOL/L (ref 21–32)
CO2 SERPL-SCNC: 19 MMOL/L (ref 21–32)
CREAT BLD-MCNC: 4.51 MG/DL (ref 0.7–1.3)
CREAT BLD-MCNC: 5.5 MG/DL (ref 0.7–1.3)
GLUCOSE BLD-MCNC: 168 MG/DL (ref 70–99)
GLUCOSE BLD-MCNC: 97 MG/DL (ref 70–99)
OSMOLALITY SERPL CALC.SUM OF ELEC: 306 MOSM/KG (ref 275–295)
OSMOLALITY SERPL CALC.SUM OF ELEC: 314 MOSM/KG (ref 275–295)
POTASSIUM SERPL-SCNC: 4.2 MMOL/L (ref 3.5–5.1)
POTASSIUM SERPL-SCNC: 4.3 MMOL/L (ref 3.5–5.1)
SODIUM SERPL-SCNC: 140 MMOL/L (ref 136–145)
SODIUM SERPL-SCNC: 144 MMOL/L (ref 136–145)

## 2019-01-01 PROCEDURE — 87086 URINE CULTURE/COLONY COUNT: CPT | Performed by: EMERGENCY MEDICINE

## 2019-01-01 PROCEDURE — 80048 BASIC METABOLIC PNL TOTAL CA: CPT | Performed by: INTERNAL MEDICINE

## 2019-01-01 PROCEDURE — 99284 EMERGENCY DEPT VISIT MOD MDM: CPT

## 2019-01-01 PROCEDURE — 36415 COLL VENOUS BLD VENIPUNCTURE: CPT

## 2019-01-01 PROCEDURE — 87186 SC STD MICRODIL/AGAR DIL: CPT | Performed by: EMERGENCY MEDICINE

## 2019-01-01 PROCEDURE — 87186 SC STD MICRODIL/AGAR DIL: CPT | Performed by: INTERNAL MEDICINE

## 2019-01-01 PROCEDURE — 80048 BASIC METABOLIC PNL TOTAL CA: CPT

## 2019-01-01 PROCEDURE — 87086 URINE CULTURE/COLONY COUNT: CPT | Performed by: INTERNAL MEDICINE

## 2019-01-01 PROCEDURE — 76775 US EXAM ABDO BACK WALL LIM: CPT | Performed by: INTERNAL MEDICINE

## 2019-01-01 PROCEDURE — 71046 X-RAY EXAM CHEST 2 VIEWS: CPT | Performed by: HOSPITALIST

## 2019-01-01 PROCEDURE — 93306 TTE W/DOPPLER COMPLETE: CPT | Performed by: HOSPITALIST

## 2019-01-01 PROCEDURE — 83880 ASSAY OF NATRIURETIC PEPTIDE: CPT | Performed by: INTERNAL MEDICINE

## 2019-01-01 PROCEDURE — 99282 EMERGENCY DEPT VISIT SF MDM: CPT

## 2019-01-01 PROCEDURE — 80053 COMPREHEN METABOLIC PANEL: CPT

## 2019-01-01 PROCEDURE — 99232 SBSQ HOSP IP/OBS MODERATE 35: CPT | Performed by: NURSE PRACTITIONER

## 2019-01-01 PROCEDURE — 87077 CULTURE AEROBIC IDENTIFY: CPT | Performed by: INTERNAL MEDICINE

## 2019-01-01 PROCEDURE — 84300 ASSAY OF URINE SODIUM: CPT | Performed by: EMERGENCY MEDICINE

## 2019-01-01 PROCEDURE — 87077 CULTURE AEROBIC IDENTIFY: CPT | Performed by: EMERGENCY MEDICINE

## 2019-01-01 PROCEDURE — 84156 ASSAY OF PROTEIN URINE: CPT | Performed by: EMERGENCY MEDICINE

## 2019-01-01 PROCEDURE — 99223 1ST HOSP IP/OBS HIGH 75: CPT | Performed by: NURSE PRACTITIONER

## 2019-01-01 PROCEDURE — 81001 URINALYSIS AUTO W/SCOPE: CPT | Performed by: INTERNAL MEDICINE

## 2019-01-01 RX ORDER — HEPARIN SODIUM 5000 [USP'U]/ML
5000 INJECTION, SOLUTION INTRAVENOUS; SUBCUTANEOUS EVERY 12 HOURS SCHEDULED
Status: DISCONTINUED | OUTPATIENT
Start: 2019-01-01 | End: 2019-01-01

## 2019-01-01 RX ORDER — AMLODIPINE BESYLATE 2.5 MG/1
2.5 TABLET ORAL DAILY
Status: DISCONTINUED | OUTPATIENT
Start: 2019-01-01 | End: 2019-01-01

## 2019-01-01 RX ORDER — ACETAMINOPHEN 325 MG/1
650 TABLET ORAL EVERY 6 HOURS PRN
Status: DISCONTINUED | OUTPATIENT
Start: 2019-01-01 | End: 2019-01-01

## 2019-01-01 RX ORDER — ONDANSETRON 2 MG/ML
4 INJECTION INTRAMUSCULAR; INTRAVENOUS EVERY 6 HOURS PRN
Status: DISCONTINUED | OUTPATIENT
Start: 2019-01-01 | End: 2019-01-01

## 2019-01-01 RX ORDER — SODIUM CHLORIDE 0.9 % (FLUSH) 0.9 %
3 SYRINGE (ML) INJECTION AS NEEDED
Status: DISCONTINUED | OUTPATIENT
Start: 2019-01-01 | End: 2019-01-01

## 2019-01-01 RX ORDER — ALFUZOSIN HYDROCHLORIDE 10 MG/1
10 TABLET, EXTENDED RELEASE ORAL
Status: DISCONTINUED | OUTPATIENT
Start: 2019-01-01 | End: 2019-01-01

## 2019-01-01 RX ORDER — FINASTERIDE 5 MG/1
5 TABLET, FILM COATED ORAL
Status: DISCONTINUED | OUTPATIENT
Start: 2019-01-01 | End: 2019-01-01

## 2019-01-01 RX ORDER — DONEPEZIL HYDROCHLORIDE 5 MG/1
5 TABLET, FILM COATED ORAL NIGHTLY
Status: DISCONTINUED | OUTPATIENT
Start: 2019-01-01 | End: 2019-01-01

## 2019-01-01 RX ORDER — MAGNESIUM OXIDE 400 MG (241.3 MG MAGNESIUM) TABLET
3 TABLET NIGHTLY
Status: DISCONTINUED | OUTPATIENT
Start: 2019-01-01 | End: 2019-01-01

## 2019-01-01 RX ORDER — SODIUM CHLORIDE 9 MG/ML
INJECTION, SOLUTION INTRAVENOUS CONTINUOUS
Status: DISCONTINUED | OUTPATIENT
Start: 2019-01-01 | End: 2019-01-01

## 2019-02-18 PROBLEM — I10 ESSENTIAL HYPERTENSION: Status: ACTIVE | Noted: 2019-01-01

## 2019-03-22 PROBLEM — N17.9 ACUTE KIDNEY FAILURE (HCC): Status: ACTIVE | Noted: 2019-01-01

## 2019-03-26 NOTE — PROGRESS NOTES
- renal function is still rising  - repeat BMP on Thursday  - Hold off on further lasix unless having edema or SOB.

## 2019-03-26 NOTE — PROGRESS NOTES
Unable to contact patient. VM has been left with lab results and recommendations per MD note. Patient instructed to contact office to confirm receipt of instructions. Phone consent was reviewed prior to VM. Ok to leave detailed labs results .

## 2019-03-28 PROBLEM — N19 RENAL FAILURE: Status: ACTIVE | Noted: 2019-01-01

## 2019-03-28 NOTE — H&P
[facility] Hospitalist H&P       CC: renal failure    PCP: Julianne Hill MD    History of Present Illness: Mr. Reema Byrd is a 81year old male with PMH sig for HTN, HLD, depression, CKD stage 4, hs of freq UTI's, dementia, who presents with worsening renal fa Comment:IV iodine     Home Medications:    Outpatient Medications Marked as Taking for the 3/28/19 encounter [facility] Encounter):  Sulfamethoxazole-TMP -160 MG Oral Tab per tablet Take 1 tablet by mouth 2 (two) times daily for 10 days.  Disp: 20 tabl edema    Skin: Skin color, texture, turgor normal. No rashes or lesions.     Neurologic: Normal strength, no focal deficit appreciated     Diagnostic Data:    CBC/Chem  Recent Labs   Lab  03/22/19   1050   WBC  6.59   HGB  9.9*   MCV  91.2   PLT  175 Hospitalist  Answering Service number: [phone]

## 2019-03-28 NOTE — PROGRESS NOTES
Patient's wife made aware and wants to speak to MD directly regarding patient.   Phone number [phone]

## 2019-03-29 PROBLEM — Z71.89 ADVANCED CARE PLANNING/COUNSELING DISCUSSION: Status: ACTIVE | Noted: 2019-01-01

## 2019-03-29 PROBLEM — Z71.89 GOALS OF CARE, COUNSELING/DISCUSSION: Status: ACTIVE | Noted: 2019-01-01

## 2019-03-29 NOTE — CONSULTS
[place] [address] Patient Status:  Inpatient    1935 MRN [id number]   Location [facility] 3W/SW Attending James Prater MD   Hosp Day # 1 PCP Ophelia Srivastava MD     Date of Consult son Yvette Aden lives on the [place]. Daughter Federico Russ is apparently not active in her parent's lives (per [place]). There is also a son Zeus Pitts who  from alcoholism (per [place]). THE [facility] OF [facility] is retired from work as a .  He also used to sell medical malp patient/family, conduct GOC/wishes discussions, and assistance with advance care planning needs. I discussed the differences between palliative care and hospice. Palliative care brochure provided to KT. I discussed patient's current clinical condition. the long-term care insurance. Andrew Smith echoed the same thought. JAY JAY says that Dasha Baer completed HCPOA paperwork in the past. JAY JAY is Jose's #1 HCPOA. I also discussed that Dashadonavan Baer is a full code in the computer.  I discussed the benefits verus burdens of re • TOTAL KNEE REPLACEMENT  2007    right       Substance History:  Smoking Status: former  Hx of Substance Use/Abuse: none    Cheondoism/Cultural Information  Cheondoism Affiliation: Presbyterian   requested: yes  Ethnicity:     Allergies: greater than right basilar atelectasis/scarring. Otherwise, negative for radiographically evident acute intrathoracic process. 2. Hyperinflation.    Dictated by (CST): Graceann Duane, MD on 3/28/2019 at 21:01     Approved by (CST): Graceann Duane, MD on 3/ make referral  -Provided emotional support to pt/wife/daughter who all seem to be coping fairly  -See above narrative for further details      Advanced care planning/counseling discussion  -Order for DNR/DNI - POLST form completed today  -#1 HCPOA is wife

## 2019-03-29 NOTE — CM/SW NOTE
SW received an MDO regarding home health. ADINA spoke with the pt's daughter Ismael Coates [phone]. Pt lives in house with 1 stood to enter and 15 inside. Pt lives with his wife who is available to assist the pt as needed at discharge.  The pt's daughter Angela Davis

## 2019-03-29 NOTE — CM/SW NOTE
MD order received regarding community palliative care. [facility] is aware of discharge and community palliative care needs.       Alana Aldrich [place] ext [phone]

## 2019-03-29 NOTE — CONSULTS
[facility] HOSP - [facility]    Report of Consultation    Adirana Lawler Patient Status:  Inpatient    1935 MRN [id number]   Location [facility] 3W/SW Attending Allen Fuentes MD   Hosp Day # 1 PCP Elias Cleveland MD     Date of Admission:  3/ • TOTAL KNEE REPLACEMENT  2007    right       Family History  Family History   Problem Relation Age of Onset   • Other (Other) Father         old age   • Obesity Mother    • Other (Other) Mother         old age   • Cancer Neg        Social History  Socia Allergies    Iodine (Topical)        HIVES  Iodine Tincture             Comment:IV iodine    Review of Systems:   A comprehensive review of systems was negative.     Physical Exam:   Vital Signs:  Blood pressure 155/64, pulse 82, temperature 98 °F (36 35-40%, HL, Depression, CKD stage 4 with baseline Creatinine around 2.2-2.4, admitted for DANIELE on CKD:     DANIELE on CKD Stage 4:  - Baseline Creatinine around 2.4 in 12/2019  - DANIELE appears to be related to volume depletion and Bactrim  - Will hold lasix and ba

## 2019-03-29 NOTE — PROGRESS NOTES
[facility] Hospitalist Progress Note     CC: Hospital Follow up    PCP: Uzma Jimenez MD       Assessment/Plan:     Active Problems:    Renal failure    Goals of care, counseling/discussion    Advanced care planning/counseling discussion    Mr. Kapil Wahl is a 8 oz (67 kg)  03/22/19 1021 : 160 lb (72.6 kg)  03/20/19 1008 : 160 lb 3.2 oz (72.7 kg)       Exam    Gen: No acute distress, AO * 1  Heent: NC AT,    Pulm: Lungs clear, normal respiratory effort  CV: Heart with regular rate and rhythm   Abd: Abdomen soft, n

## 2019-03-30 NOTE — PROGRESS NOTES
[facility] [place] [facility]    Nephrology Progress Note    Jose Cruz Attending:  Sylvie Rees DO     Cc: DANIELE    SUBJECTIVE     c/o pain around lowe site, keeps pulling at it  More alert today per RN, slightly increased po intake   No other complain  03/30/2019    CO2 16.0 03/30/2019    GLU 91 03/30/2019    CA 8.7 03/30/2019    ALB 3.5 03/30/2019    MG 2.2 03/30/2019    PHOS 4.1 03/30/2019       IMAGING   All imaging studies personally reviewed. ECHO 3/29  Study Conclusions  1.  Left ventri in place due to urinary retention, consider repeat renal US    Acidosis  -- check lactate.  Change fluids to bicarb     HTN:  - Resume Amlodipine 2.5mg PO QD      Anemia in CKD:  - Will check iron studies and replete if needed.       D/w RN  D/w family    T

## 2019-03-30 NOTE — HOME CARE LIAISON
Met with patient's daughter Tosha Cummings at the bedside, (patient sleeping). Tosha Cummings asked that liaison not call her mother to discuss home health.  She is tentatively agreeable to [facility], but will check with her mother and notify [facility] staff

## 2019-03-30 NOTE — PROGRESS NOTES
[facility] Hospitalist Progress Note     CC: Hospital Follow up    PCP: Jose Guadalupe Lemos MD       Assessment/Plan:     Active Problems:    Renal failure    Goals of care, counseling/discussion    Advanced care planning/counseling discussion      Fabby Danyel is a 8 hour   Intake 870 ml   Output 2575 ml   Net -1705 ml       Last 3 Weights  03/30/19 0606 : 145 lb (65.8 kg)  03/29/19 0506 : 147 lb 11.2 oz (67 kg)  03/22/19 1021 : 160 lb (72.6 kg)  03/20/19 1008 : 160 lb 3.2 oz (72.7 kg)       Exam    Gen: No acute distr HCl ER  10 mg Oral Daily with breakfast   • Heparin Sodium (Porcine)  5,000 Units Subcutaneous 2 times per day     • sodium bicarbonate infusion       Normal Saline Flush, acetaminophen, ondansetron HCl

## 2019-03-30 NOTE — DIETARY NOTE
ADULT NUTRITION INITIAL ASSESSMENT    Pt is at moderate nutrition risk. Pt does not meet malnutrition criteria.       RECOMMENDATIONS TO MD:  CPM Liberalized to low salt diet only (no fat restriction) and added 1 serving Nepro/day in view of unplanned weig • Edema, unspecified type 7/17/2018   • Elevated BP without diagnosis of hypertension 12/29/2016   • Essential hypertension 2/18/2019   • AIDA (generalized anxiety disorder)    • History of recurrent UTI (urinary tract infection) 11/11/2015   • Hyperglyce times per day       LABS: reviewed  DANIELE on CKD per MD. Acidosis receiving bicarb/iv's.    Recent Labs      03/28/19   1822  03/29/19   0621  03/30/19   0532   GLU  118*  98  91   BUN  58*  55*  54*   CREATSERUM  5.21*  5.07*  4.38*   CA  8.4*  8.4*  8.7   M

## 2019-03-30 NOTE — PLAN OF CARE
Problem: Patient Centered Care  Goal: Patient preferences are identified and integrated in the patient's plan of care  Interventions:  - What would you like us to know as we care for you? From home with wife.   - Provide timely, complete, and accurate infor baseline  INTERVENTIONS:  - Continuous cardiac monitoring, monitor vital signs, obtain 12 lead EKG if indicated  - Evaluate effectiveness of antiarrhythmic and heart rate control medications as ordered  - Initiate emergency measures for life threatening ar

## 2019-03-31 NOTE — PROGRESS NOTES
[facility] Hospitalist Progress Note     CC: Hospital Follow up    PCP: Urvashi Davis MD       Assessment/Plan:     Active Problems:    Renal failure    Goals of care, counseling/discussion    Advanced care planning/counseling discussion      Alisson Evans is a 8 (36.5 °C)  Pulse:  [66-81] 81  Resp:  [16-18] 16  BP: (100-159)/(48-73) 100/48      Intake/Output:    Intake/Output Summary (Last 24 hours) at 3/31/2019 1638  Last data filed at 3/31/2019 0950  Gross per 24 hour   Intake 792 ml   Output 1425 ml   Net -633 evident acute intrathoracic process. 2. Hyperinflation.    Dictated by (CST): Ethel Bliss MD on 3/28/2019 at 21:01     Approved by (CST): Ethel Bliss MD on 3/28/2019 at 21:06              Meds:     • amLODIPine Besylate  2.5 mg Oral Daily   • melato

## 2019-04-01 NOTE — PROGRESS NOTES
[facility] - [facility]    Nephrology Progress Note    Tala Huang Attending:  Yane Gandhi DO     Cc: DANIELE    SUBJECTIVE     Appears more alert today  Denies n/v/d/sob  Rondon in place  No other complaints    PHYSICAL EXAM   Vital signs: /48 03/31/2019    K 4.4 03/31/2019     03/31/2019    CO2 18.0 03/31/2019     03/31/2019    CA 8.2 03/31/2019    ALB 3.1 03/31/2019    MG 2.0 03/31/2019    PHOS 3.8 03/31/2019       IMAGING   All imaging studies personally reviewed.     ECHO 3/29  S downtrending slowly, adequate UOP. Continue gentle IVFs  - Rondon in place due to urinary retention, check repeat renal US tomorrow to ensure given new finding     Urinary retention  -- see above. Rondon in place    Acidosis  -- lactate wnl.  Changed fluids t

## 2019-04-01 NOTE — PALLIATIVE CARE NOTE
[facility]  Palliative Care Follow Up    Reinier Lopez Patient Status:  Inpatient    1935 MRN [id number]   Location [facility] 3W/SW Attending Carly Cardona MD   Hosp Day # 4 PCP Guy Browning MD     Date of Con hour   Intake 600 ml   Output 2650 ml   Net -2050 ml       Physical Exam:  General: chronically ill, comfortable  Nutritional status: thin  HEENT: mildly Sun'aq, + eye glasses  Chest appearance: Kyphosis  Cardiac: deferred  Lungs: Normal excursions and effort PSA 1.0 01/09/2014    MG 2.1 04/01/2019    PHOS 2.9 04/01/2019    TROP 0.14 () 11/22/2017       Imaging:        Palliative Performance Scale : 50%      Palliative Care Goals of Care:  Discussed with Patient and Family: Yes  Patient's preference about [id number]  4/1/2019  9:44 AM

## 2019-04-01 NOTE — PHYSICAL THERAPY NOTE
PHYSICAL THERAPY EVALUATION - INPATIENT     Room Number: 591/606-R  Evaluation Date: 4/1/2019  Type of Evaluation: Initial   Physician Order: PT Eval and Treat    Presenting Problem: renal failure  Reason for Therapy: Mobility Dysfunction and Discharge Pl History related to current admission: Per H&P: \"Mr. Catrachito De Santiago is a 81year old male with PMH sig for HTN, HLD, depression, CKD stage 4, hs of freq UTI's, dementia, who presents with worsening renal failure.   Patient denies complaints states he has been • TOTAL KNEE REPLACEMENT  2007    right       HOME SITUATION  Type of Home: House   Home Layout: Two level  Stairs to Enter : 1     Stairs to [employer]: 15       Lives With: Spouse  Drives: No          Prior Level of [place]: Pt was independent w/ mos Need to walk in hospital room?: A Little   -   Climbing 3-5 steps with a railing?: A Little     AM-PAC Score:  Raw Score: 18   Approx Degree of Impairment: 46.58%   Standardized Score (AM-PAC Scale): 43.63   CMS Modifier (G-Code): CK    FUNCTIONAL ABILITY

## 2019-04-01 NOTE — PROGRESS NOTES
[facility]    Nephrology Progress Note      SUBJECTIVE     Resting comfortably in bed  No complaints, denies SOB     PHYSICAL EXAM   Vital signs: /50 (BP Location: Right arm)   Pulse 66   Temp 97.3 °F (36.3 °C) (Oral)   Resp 18   Wt 1 reviewed. ECHO 3/29  Study Conclusions  1. Left ventricle: The cavity size was normal. Wall thickness was     normal. Systolic function was normal. The estimated ejection     fraction was 55%.  Although no diagnostic regional wall motion     abnormality wife updated at bedside    Disp: potential d/c in the next 24-48 hours if renal fxn continues to improve       Kerrie Delcid MD  [place] [address] Group Nephrology

## 2019-04-01 NOTE — OCCUPATIONAL THERAPY NOTE
OCCUPATIONAL THERAPY EVALUATION - INPATIENT     Room Number: 010/537-Q  Evaluation Date: 4/1/2019  Type of Evaluation: Initial  Presenting Problem: worsening renal function    Physician Order: IP Consult to Occupational Therapy  Reason for Therapy: ADL/IAD strength and range of motion are [facility] for oral facial hygiene, upper extremity dressing, and self- feeding in supported sitting position provided set-up. Pt assisted in ordering breakfast. Pt left with call light in reach, stable, chair alarm on.      The p Surgical History  Past Surgical History:   Procedure Laterality Date   • OTHER SURGICAL HISTORY  1/8/14    Cysto-Dr Cipriano Hamilton   • [address] [facility] CATARACT [place]  LENS  2007   • [address] [facility] CATARACT EXTRACAP,INSERT LENS  2008   • TOTAL KNEE REPLACEMENT  2007    right ‘6-Clicks’ Inpatient Daily Activity Short Form  How much help from another person does the patient currently need…  -   Putting on and taking off regular lower body clothing?: A Little  -   Bathing (including washing, rinsing, drying)?: A Little  -   Toile

## 2019-04-01 NOTE — PROGRESS NOTES
[facility] Hospitalist Progress Note     CC: Hospital Follow up    PCP: Benita Ridley MD       Assessment/Plan:     Active Problems:    Renal failure    Goals of care, counseling/discussion    Advanced care planning/counseling discussion      Carmen Gabino is a 8 97.3 °F (36.3 °C)  Pulse:  [66-77] 66  Resp:  [18] 18  BP: (116-129)/(50-61) 118/50      Intake/Output:    Intake/Output Summary (Last 24 hours) at 4/1/2019 1516  Last data filed at 4/1/2019 1400  Gross per 24 hour   Intake 860 ml   Output 3350 ml   Net -2 Donepezil HCl  5 mg Oral Nightly   • finasteride  5 mg Oral Daily   • Alfuzosin HCl ER  10 mg Oral Daily with breakfast   • Heparin Sodium (Porcine)  5,000 Units Subcutaneous 2 times per day       Normal Saline Flush, acetaminophen, ondansetron HCl

## 2019-04-02 NOTE — OCCUPATIONAL THERAPY NOTE
OCCUPATIONAL THERAPY TREATMENT NOTE - INPATIENT        Room Number: 870/872-L           Presenting Problem: worsening renal function    Problem List  Active Problems:    Renal failure    Goals of care, counseling/discussion    Advanced care planning/counse simplification techniques;ADL training;Functional transfer training;UE strengthening/ROM; Endurance training; Compensatory technique education    SUBJECTIVE  \"will the bed beep when I get up? Why does it do that? \"    OBJECTIVE  Precautions: Bed/chair alarm    Goals  on: 4/15  Frequency: 3-5x/wk

## 2019-04-02 NOTE — PALLIATIVE CARE NOTE
[facility]  Palliative Care Follow Up    Esteban Holly Patient Status:  Inpatient    1935 MRN [id number]   Location [facility] 3W/SW Attending Sergei Goncalves MD   Hosp Day # 5 PCP Ovi Ojeda MD     Date of Con Current Facility-Administered Medications:   •  amLODIPine Besylate (NORVASC) tab 2.5 mg, 2.5 mg, Oral, Daily  •  melatonin tab TABS 3 mg, 3 mg, Oral, Nightly  •  Donepezil HCl (ARICEPT) tab 5 mg, 5 mg, Oral, Nightly  •  finasteride (PROSCAR) tab 5 mg, Number: [phone]          Hospitalization:  DNR/DNI  No medically administered means of nutrition including feeding tubes    Palliative Care Assessment/Plan:    Renal failure        CKD Stage 4       Dementia       Systolic heart failure  -LVEF 55%

## 2019-04-02 NOTE — PROGRESS NOTES
[facility]    Nephrology Progress Note      SUBJECTIVE     Reports eating and drinking well, since stopping IVFs  No complaints today   Labs pending       PHYSICAL EXAM   Vital signs: /50 (BP Location: Right arm)   Pulse 71   Temp 97. ventricle: The cavity size was normal. Wall thickness was     normal. Systolic function was normal. The estimated ejection     fraction was 55%.  Although no diagnostic regional wall motion     abnormality was identified, this possibility cannot be     comp to improve       MD Sefer[facility] Orell[address] [facility] Nephrology

## 2019-04-02 NOTE — PROGRESS NOTES
[facility] Hospitalist Progress Note     CC: Hospital Follow up    PCP: Salbador Fuentes MD       Assessment/Plan:     Active Problems:    Renal failure    Goals of care, counseling/discussion    Advanced care planning/counseling discussion    Mr. Wheeler Plan is a 8 °C)-97.9 °F (36.6 °C)] 97.8 °F (36.6 °C)  Pulse:  [66-71] 71  Resp:  [16-22] 22  BP: (102-134)/(47-62) 102/50      Intake/Output:    Intake/Output Summary (Last 24 hours) at 4/2/2019 1546  Last data filed at 4/2/2019 1247  Gross per 24 hour   Intake 270 ml 4.0  3.5  3.1*  3.1*  3.1*         Imaging:          Meds:     • amLODIPine Besylate  2.5 mg Oral Daily   • melatonin  3 mg Oral Nightly   • Donepezil HCl  5 mg Oral Nightly   • finasteride  5 mg Oral Daily   • Alfuzosin HCl ER  10 mg Oral Daily with break

## 2019-04-03 PROBLEM — Z66 DNR (DO NOT RESUSCITATE): Status: ACTIVE | Noted: 2019-01-01

## 2019-04-03 NOTE — PROGRESS NOTES
[facility]    Nephrology Progress Note      SUBJECTIVE     Resting comfortably in bed  Eating and drinking well  Having urinary retention     PHYSICAL EXAM   Vital signs: /52 (BP Location: Right arm)   Pulse 64   Temp 98.1 °F (36.7 °C personally reviewed. ECHO 3/29  Study Conclusions  1. Left ventricle: The cavity size was normal. Wall thickness was     normal. Systolic function was normal. The estimated ejection     fraction was 55%.  Although no diagnostic regional wall motion     a avoid KAYCEE w DANIELE     D/w RN     Dispo: potential discharge later today. Will need BMP drawn within 1 week and follow up with Dr. Sarah Felix in 2-4 weeks.       Lyle Ennis MD  [address] [facility] Nephrology

## 2019-04-03 NOTE — DISCHARGE SUMMARY
General Medicine Discharge Summary     Patient ID:  Matty Aguilera  81year old  1/24/1935    Admit date: 3/28/2019    Discharge date and time: 04/03/19    Attending Physician: Mo Levine MD     Primary Care Physician: Golden Gold MD     Re -has had 2 episodes >1L urinary retention  -known moderate retention issues, follows with [facility]  -cont proscar, alfuzosin  - has hx of incomplete bladder emptying with 400 cc on PVR in urology office in February  - now voiding post lowe removal, has jaye * This list has 2 medication(s) that are the same as other medications prescribed for you. Read the directions carefully, and ask your doctor or other care provider to review them with you.             STOP taking these medications    Sulfamethoxazole-TMP

## 2019-04-03 NOTE — CM/SW NOTE
RN informed SW that pt is medically cleared for discharge. SW notified [facility] liaison Kay Doyle [phone] to start both home healthcare and community palliative services. Orders previously sent.      Yany Shay  [phone]

## 2019-04-29 NOTE — PROGRESS NOTES
Called pt, spoke with KT. PT gets his labs drawn by [facility]. Called [facility] home health faxed over orders. Pt wife states that he is eating and drinking appropriately.

## 2019-04-30 NOTE — PROGRESS NOTES
Unable to reach patient via phone. No call identifier on VM. Per HIPPA guidelines VM has been left to return call at earliest convenience. Will attempt to reach patient at a later time.

## 2019-05-01 NOTE — PROGRESS NOTES
Called and explained to pt. Called [facility] to make sure they received the labs and will collect them. They verified they received orders and will make sure  is aware.

## 2019-05-01 NOTE — PROGRESS NOTES
- renal function is stable but above baseline at this time. - Continue well hydration and eating well.   - start on sodium bicarb 325mg PO BID at this time.  - Repeat BMP and urine Na, urine Creatinin and urine protein in AM.  - If remain elevated then gertrude

## 2019-05-06 NOTE — PROGRESS NOTES
KT would like Dr. Albina Fletcher to look into the amoxicillin before starting the pt on it because she believes last time the pt took it he was sent to the hospital. She also was told to have the pt stop the aricept because it can not be taken with the Nabicarb and

## 2019-05-07 NOTE — ED NOTES
Patient sent over from physicians office for elevated creatinine. Patient denies any nausea, vomiting. Patient states he still urinates. Per patient no hx of dialysis, but history of renal issues.

## 2019-05-07 NOTE — ED INITIAL ASSESSMENT (HPI)
Pt had lab work done with urologist today and received a called that creatine level was elevated at 5.7

## 2019-05-07 NOTE — ED PROVIDER NOTES
Patient Seen in: [facility] AND [facility] Emergency Department    History   Patient presents with:  Urinary Symptoms (urologic)    Stated Complaint:     HPI    Patient presents with no complaints.   His wife reports that he was sent into be evaluated because of EXTRACAP,INSERT LENS  2007   • [address] [facility] CATARACT EXTRACAP,INSERT LENS  2008   • TOTAL KNEE REPLACEMENT  2007    right       Social History    Tobacco Use      Smoking status: Former Smoker        Packs/day: 0.50        Years: 10.00        Pack years: 5 Pulse 68   Temp 97.4 °F (36.3 °C) (Oral)   Resp 18   Ht 175.3 cm (5' 9\")   Wt 69.4 kg   SpO2 100%   BMI 22.59 kg/m²         Physical Exam  Constitutional:  Alert, well nourished adult lying in bed in no distress. Vital signs noted.   Eye:  No scleral ict including reassessment of your blood pressure.       Clinical Impression:  Acute renal failure superimposed on chronic kidney disease, unspecified CKD stage, unspecified acute renal failure type ([facility] [address]  (primary encounter diagnosis)  Urinary retention    Dis

## 2019-05-07 NOTE — ED NOTES
Spoke with patient's daughter and wife. Per family they have already discussed with several doctors that the patient no longer wants to be treated for the kidney injury.

## 2019-05-08 NOTE — ED NOTES
Family at the bedside. Patient given discharge instructions. Patient verbalized understanding. Ambulated out of ED with steady gait.

## 2019-05-08 NOTE — PROGRESS NOTES
Agree with Dr Laura Castro  Had discussion with family after referal to the ED for worsening renal function with plan for possible IVF and lowe placement, Discussed with Daughter and KT ( wife) and they werent comfortable with having him admitted to the hospit

## 2019-05-08 NOTE — PROGRESS NOTES
Needs hospice information session this week  Dx ESRD  Defers dialysis    [facility] hospice consult  I d/w family

## 2019-05-23 PROBLEM — N18.4 CKD (CHRONIC KIDNEY DISEASE) STAGE 4, GFR 15-29 ML/MIN (HCC): Status: RESOLVED | Noted: 2018-01-01 | Resolved: 2019-01-01

## 2019-05-23 PROBLEM — N18.5 CKD (CHRONIC KIDNEY DISEASE) STAGE 5, GFR LESS THAN 15 ML/MIN (HCC): Status: ACTIVE | Noted: 2019-01-01

## 2021-02-10 NOTE — PHYSICAL THERAPY NOTE
PHYSICAL THERAPY EVALUATION - INPATIENT     Room Number: 303/303-A  Evaluation Date: 11/24/2017  Type of Evaluation: Initial  Physician Order: PT Eval and Treat    Presenting Problem: community aquired pneumonia  Reason for Therapy: Mobility Dysfunctio PROVIDER:[TOKEN:[[id number]]] fatigue.     Problem List  Principal Problem:    Community acquired pneumonia of left lower lobe of lung ([facility] [address]  Active Problems:    Encephalopathy    Chronic renal impairment, unspecified CKD stage    Elevated troponin I level      Past Medical History  Pas Restraints;Bed/chair alarm  Fall Risk: High fall risk    WEIGHT BEARING RESTRICTION  Weight Bearing Restriction: None                PAIN ASSESSMENT  Rating: Unable to rate          COGNITION  · Overall Cognitive Status:  Impaired  · Orientation Level:  or placement    Bed Mobility: min A with tactile cues to his B LE and trunk    Transfers:min A for transfers with emphasis on postural awareness    Exercise/Education Provided:  Bed mobility  Body mechanics  Gait training  Posture  Strengthening  Transfer tra

## 2025-06-06 NOTE — PLAN OF CARE
Patient Centered Care    • Patient preferences are identified and integrated in the patient's plan of care Progressing        Patient/Family Goals    • Patient/Family Long Term Goal Progressing    • Patient/Family Short Term Goal Progressing normal/no clubbing/no cyanosis

## (undated) NOTE — IP AVS SNAPSHOT
Patient Demographics     Address  [place] [address] Phone  [phone] (Home) *Preferred*  [phone] [facility]) E-mail Address  [email or web address]      Emergency Contact(s)     Name Relation Home Work Mobile    Suman Spouse [phone] Take 1 tablet (81 mg total) by mouth daily. David Summers MD         atorvastatin 10 MG Tabs  Commonly known as:  LIPITOR  Next dose due:  Tuesday AM 11/28/17      Take 1 tablet (10 mg total) by mouth daily.    Madeleine Hoover MD         [facility] Michael Engel MD         tamsulosin HCl 0.4 MG Caps  Commonly known as:  FLOMAX  Next dose due:  Monday PM 11/27/17      TAKE 1 CAPSULE BY MOUTH TWICE DAILY   Winsome Fletcher MD         Vitamin D3 3000 units Tabs  Next dose due:  Tuesday AM 11/28/17      T [id number] aspirin EC tab 81 mg 11/27/17 1010 Given      [id number] atorvastatin (LIPITOR) tab 10 mg 11/27/17 1010 Given      [id number] azithromycin (ZITHROMAX) 500 mg in sodium chloride 0.9 % 250 mL IVPB add-vantage 11/26/17 1522 New Bag      [id number] fin Glucose 117 70 - 99 mg/dL [facility]   Sodium 142 136 - 144 mmol/L — [facility]   Potassium 4.0 3.3 - 5.1 mmol/L — [facility]   Chloride 115 95 - 110 mmol/L [facility]   CO2 22 22 - 32 mmol/L — [facility]   BUN 22 8 - 20 mg/dL [facility] Blood Culture Result No Growth 5 Days    Urine Culture, Routine Once [[id number]] Collected:  11/22/17 0942    Order Status:  Completed Lab Status:  Final result Updated:  11/23/17 1201    Specimen:  Urine from Urine, clean catch      Urine Culture No Rony coughing more, with URI symptoms, chills. Patient has also been feeling generally weak and fatigued, and has had 3 mechanical falls the past 24hours. His wife states he has also been more confused the last 24 hours.   He denies chest pain or sob, no fevers Comment: 1/12/10 QUIT 27 YEARS AGO    Alcohol use No[TN.2]        Fam Hx[TN.1]  Family History   Problem Relation Age of Onset   • Other Dayday Santoyo Father      old age   • Obesity Mother    • Other Dayday Santoyo Mother      old age   • Cancer Neg[TN.2]        Revi atherosclerosis cavernous carotid arteries. 4. Right parietal scalp contusion. Xr Chest Ap Portable  (cpt=71010)    Result Date: 11/22/2017  CONCLUSION:  1.  Mild left retrocardiac opacification.[TN.2]             ASSESSMENT / PLAN:     Patient is a Answering Service number: [phone][[email or web address]]      Electronically signed by Jaziel Villalobos MD on 11/22/2017 12:21 PM   Attribution Key    TN. 1 - Jaziel Villalobos MD on 11/22/2017 11:48 AM  TN. 2 - Jaziel Villalobos MD on 11/22/2017 11:49 AM  TN. 3 - Jaziel Villalobos MD on 11/22/201 • Pure hypercholesterolemia    • Recurrent major depressive disorder, [facility] status unspecified [facility] 3/11/2016   • Recurrent urosepsis 2/19/2014   • Recurrent UTI    • Renal disorder    • Unspecified arthropathy, lower leg    • Unspecified arthropathy, route daily as needed for Rhinitis.  Disp:  Rfl:    TAMSULOSIN HCL 0.4 MG Oral Cap TAKE 1 CAPSULE BY MOUTH TWICE DAILY Disp: 180 capsule Rfl: 0   DONEPEZIL HCL 5 MG Oral Tab TAKE 1 TABLET BY MOUTH EVERY NIGHT AT BEDTIME Disp: 90 tablet Rfl: 0   PARoxetine H BP: (!) 168/85 (!) 174/84 (!) 170/86 (!) 176/83   BP Location:   Right arm Right arm   Pulse: 82 84 85 80   Resp: 20 20  20   Temp:    98.1 °F (36.7 °C)   TempSrc:    Oral   SpO2: 99% 97% 99% 97%   Weight:         General: NAD  HEENT: OP clear  Neck: no ce displayed poor safety awareness with the RW and tactile cues for proper walker alignment and guidance.  The pt is currently presenting with decreased balance and safety and generalize weakness during this session activity which are effecting his overall fun AM-PAC Score:  Raw Score: 17   PT Approx Degree of Impairment Score: 50.57%   Standardized Score (AM-PAC Scale): 42.13   CMS Modifier (G-Code): CK    FUNCTIONAL ABILITY STATUS  Gait Assessment   Gait Assistance: Minimum assistance  Distance (ft): [address] No notes of this type exist for this encounter. SLP Notes     No notes of this type exist for this encounter.             Immunizations     Name Date      INFLUENZA 09/27/17     INFLUENZA 09/01/16     INFLUENZA 10/01/15     INFLUENZA 09/25/14     INFL

## (undated) NOTE — IP AVS SNAPSHOT
[facility]            (For Outpatient Use Only) Initial Admit Date: 11/22/2017   Inpt/Obs Admit Date: Inpt: 11/22/17 / Obs: N/A   Discharge Date:    Camryn Lansing:  [de-identified]   MRN: [de-identified]   CSN: [id number]        ENCOUNTER  Patient Cla Subscriber ID:  Pt Rel to Subscriber:    Hospital Account Financial Class: Medicare    November 27, 2017

## (undated) NOTE — ED AVS SNAPSHOT
Reillyxavier Douglas   MRN: [id number]    Department:  [facility] Emergency Department   Date of Visit:  5/7/2019           Disclosure     Insurance plans vary and the physician(s) referred by the ER may not be covered by your plan.  Please contact within the next three months to obtain basic health screening including reassessment of your blood pressure.     IF THERE IS ANY CHANGE OR WORSENING OF YOUR CONDITION, CALL YOUR PRIMARY CARE PHYSICIAN AT ONCE OR RETURN IMMEDIATELY TO THE EMERGENCY DEPARTMEN